# Patient Record
Sex: FEMALE | Race: WHITE | NOT HISPANIC OR LATINO | Employment: OTHER | URBAN - METROPOLITAN AREA
[De-identification: names, ages, dates, MRNs, and addresses within clinical notes are randomized per-mention and may not be internally consistent; named-entity substitution may affect disease eponyms.]

---

## 2023-09-21 ENCOUNTER — OFFICE VISIT (OUTPATIENT)
Dept: ENDOCRINOLOGY | Facility: CLINIC | Age: 68
End: 2023-09-21
Payer: MEDICARE

## 2023-09-21 VITALS
OXYGEN SATURATION: 99 % | HEART RATE: 82 BPM | HEIGHT: 64 IN | BODY MASS INDEX: 30.39 KG/M2 | DIASTOLIC BLOOD PRESSURE: 62 MMHG | SYSTOLIC BLOOD PRESSURE: 124 MMHG | WEIGHT: 178 LBS

## 2023-09-21 DIAGNOSIS — Z79.4 CONTROLLED TYPE 2 DIABETES MELLITUS WITH HYPERGLYCEMIA, WITH LONG-TERM CURRENT USE OF INSULIN: Primary | ICD-10-CM

## 2023-09-21 DIAGNOSIS — E11.65 CONTROLLED TYPE 2 DIABETES MELLITUS WITH HYPERGLYCEMIA, WITH LONG-TERM CURRENT USE OF INSULIN: Primary | ICD-10-CM

## 2023-09-21 LAB
EXPIRATION DATE: NORMAL
EXPIRATION DATE: NORMAL
GLUCOSE BLDC GLUCOMTR-MCNC: 71 MG/DL (ref 70–130)
HBA1C MFR BLD: 10.2 %
Lab: NORMAL
Lab: NORMAL

## 2023-09-21 RX ORDER — INSULIN DEGLUDEC 200 U/ML
200 INJECTION, SOLUTION SUBCUTANEOUS
COMMUNITY

## 2023-09-21 RX ORDER — DAPAGLIFLOZIN 5 MG/1
5 TABLET, FILM COATED ORAL DAILY
Qty: 30 TABLET | Refills: 5 | Status: SHIPPED | OUTPATIENT
Start: 2023-09-21

## 2023-09-21 RX ORDER — INSULIN ASPART 100 [IU]/ML
20 INJECTION, SOLUTION INTRAVENOUS; SUBCUTANEOUS
COMMUNITY
Start: 2023-09-14

## 2023-09-21 RX ORDER — EVOLOCUMAB 140 MG/ML
140 INJECTION, SOLUTION SUBCUTANEOUS
COMMUNITY
Start: 2023-09-14

## 2023-09-21 RX ORDER — BUMETANIDE 2 MG/1
2 TABLET ORAL DAILY
COMMUNITY

## 2023-09-21 RX ORDER — GABAPENTIN 400 MG/1
400 CAPSULE ORAL 3 TIMES DAILY
COMMUNITY
Start: 2023-08-22

## 2023-09-21 RX ORDER — ICOSAPENT ETHYL 1000 MG/1
2 CAPSULE ORAL
COMMUNITY

## 2023-09-21 RX ORDER — PANTOPRAZOLE SODIUM 40 MG/1
40 TABLET, DELAYED RELEASE ORAL DAILY
COMMUNITY

## 2023-09-21 RX ORDER — COLCHICINE 0.6 MG/1
0.6 TABLET ORAL DAILY
COMMUNITY

## 2023-09-21 RX ORDER — METOPROLOL SUCCINATE 100 MG/1
100 TABLET, EXTENDED RELEASE ORAL DAILY
COMMUNITY

## 2023-09-21 RX ORDER — ROSUVASTATIN CALCIUM 40 MG/1
40 TABLET, COATED ORAL DAILY
COMMUNITY

## 2023-09-21 RX ORDER — INSULIN ASPART 100 [IU]/ML
20 INJECTION, SUSPENSION SUBCUTANEOUS 3 TIMES DAILY
COMMUNITY
End: 2023-09-21

## 2023-09-21 RX ORDER — UBIDECARENONE 75 MG
50 CAPSULE ORAL DAILY
COMMUNITY

## 2023-09-21 RX ORDER — FEBUXOSTAT 80 MG/1
80 TABLET, FILM COATED ORAL DAILY
COMMUNITY

## 2023-09-21 NOTE — PROGRESS NOTES
"     Office Note      Date: 2023  Patient Name: Renae Powers  MRN: [id number]  : 1955    Chief Complaint   Patient presents with    Diabetes     Type 2       History of Present Illness:   Renae Powers is a 68 y.o. female who presents for Diabetes type 2. Diagnosed in: . Treated in past with oral agents. Current treatments: 4 insulin shots per day . Number of insulin shots per day: 4. Checks blood sugar: >4 times per day .  Has low blood sugar: occasional.     Last A1c:  Hemoglobin A1C   Date Value Ref Range Status   2023 10.2 % Final   2022 9.6 (H) <5.7 % Final       Subjective      Diabetic Complications:  Eyes: Yes, describe: has active retinopathy  with bleeding   Kidneys: No- has some  sort of kidney damage   Feet: Yes, describe: symptomatic neuropathy   Heart: No    Diet and Exercise:  Meals per day: none  Minutes of exercise per week: <100 mins.    Review of Systems:   Review of Systems   Constitutional:  Positive for activity change and appetite change.   Musculoskeletal:  Positive for gait problem.   Psychiatric/Behavioral:  Positive for dysphoric mood.      The following portions of the patient's history were reviewed and updated as appropriate: allergies, current medications, past family history, past medical history, past social history, past surgical history, and problem list.    Objective     Visit Vitals  /62 (BP Location: Left arm, Patient Position: Sitting, Cuff Size: Adult)   Pulse 82   Ht 162.6 cm (64\")   Wt 80.7 kg (178 lb)   SpO2 99%   BMI 30.55 kg/m²           Physical Exam:  Physical Exam  Vitals reviewed.   Constitutional:       General: She is not in acute distress.     Appearance: She is not ill-appearing, toxic-appearing or diaphoretic.   HENT:      Head: Normocephalic and atraumatic.   Cardiovascular:      Pulses:           Dorsalis pedis pulses are 1+ on the right side and 1+ on the left side.        Posterior tibial pulses are 1+ on the right side and " 1+ on the left side.   Musculoskeletal:      Right foot: Decreased range of motion. Prominent metatarsal heads present.      Left foot: Decreased range of motion. Prominent metatarsal heads present.   Feet:      Right foot:      Protective Sensation: 10 sites tested.  6 sites sensed.      Skin integrity: Blister present.      Left foot:      Protective Sensation: 10 sites tested.  6 sites sensed.      Skin integrity: Blister present.      Comments: Diabetic Foot Exam Performed and Monofilament Test Performed     Neurological:      Mental Status: She is alert.   Psychiatric:         Mood and Affect: Mood normal.         Thought Content: Thought content normal.         Judgment: Judgment normal.       Assessment / Plan      Assessment & Plan:  Problem List Items Addressed This Visit          Other    Controlled type 2 diabetes mellitus with hyperglycemia, with long-term current use of insulin - Primary    Current Assessment & Plan     Seen as a new patient today  ======================  Long standing poorly controlled diabetes with advanced complications  Has neuropathy and vasculopathy  Egfr- 26  She does not eat for days at a time.   -------------------------------  I told her that in order to control her diabetes she needed to eat and take her insulin as directed.  We can try farxiga (side effects were discussed) to try to protect her kidney.  She is on a good insulin plan. She just needs to follow that plan          Relevant Medications    NovoLOG FlexPen 100 UNIT/ML solution pen-injector sc pen    Insulin Degludec (Tresiba FlexTouch) 200 UNIT/ML solution pen-injector pen injection    dapagliflozin (Farxiga) 5 MG tablet tablet    Other Relevant Orders    POC Glycosylated Hemoglobin (Hb A1C) (Completed)    POC Glucose, Blood (Completed)        Thom Hernandez MD   09/21/2023

## 2023-09-21 NOTE — ASSESSMENT & PLAN NOTE
Seen as a new patient today  ======================  Long standing poorly controlled diabetes with advanced complications  Has neuropathy and vasculopathy  Egfr- 26  She does not eat for days at a time.   -------------------------------  I told her that in order to control her diabetes she needed to eat and take her insulin as directed.  We can try farxiga (side effects were discussed) to try to protect her kidney.  She is on a good insulin plan. She just needs to follow that plan

## 2024-04-22 DIAGNOSIS — E11.65 CONTROLLED TYPE 2 DIABETES MELLITUS WITH HYPERGLYCEMIA, WITH LONG-TERM CURRENT USE OF INSULIN: ICD-10-CM

## 2024-04-22 DIAGNOSIS — Z79.4 CONTROLLED TYPE 2 DIABETES MELLITUS WITH HYPERGLYCEMIA, WITH LONG-TERM CURRENT USE OF INSULIN: ICD-10-CM

## 2024-04-23 RX ORDER — DAPAGLIFLOZIN 5 MG/1
5 TABLET, FILM COATED ORAL DAILY
Qty: 30 TABLET | Refills: 5 | Status: SHIPPED | OUTPATIENT
Start: 2024-04-23

## 2024-04-23 NOTE — TELEPHONE ENCOUNTER
Rx Refill Note  Requested Prescriptions     Pending Prescriptions Disp Refills    Farxiga 5 MG tablet tablet [Pharmacy Med Name: FARXIGA 5 MG TABLET 5 Tablet] 30 tablet 5     Sig: TAKE 1 TABLET BY MOUTH DAILY.          Last office visit with prescribing clinician: 9/21/2023     Next office visit with prescribing clinician: Visit date not found         Fabi Khan MA  04/23/24, 09:10 EDT